# Patient Record
Sex: MALE | Race: BLACK OR AFRICAN AMERICAN | Employment: FULL TIME | URBAN - METROPOLITAN AREA
[De-identification: names, ages, dates, MRNs, and addresses within clinical notes are randomized per-mention and may not be internally consistent; named-entity substitution may affect disease eponyms.]

---

## 2021-01-25 ENCOUNTER — HOSPITAL ENCOUNTER (EMERGENCY)
Age: 56
Discharge: HOME | End: 2021-01-26
Attending: EMERGENCY MEDICINE
Payer: COMMERCIAL

## 2021-01-25 ENCOUNTER — APPOINTMENT (OUTPATIENT)
Dept: GENERAL RADIOLOGY | Age: 56
End: 2021-01-25
Attending: EMERGENCY MEDICINE
Payer: COMMERCIAL

## 2021-01-25 DIAGNOSIS — F41.1 ANXIETY IN ACUTE STRESS REACTION: ICD-10-CM

## 2021-01-25 DIAGNOSIS — R07.89 MUSCULOSKELETAL CHEST PAIN: ICD-10-CM

## 2021-01-25 DIAGNOSIS — F43.0 ANXIETY IN ACUTE STRESS REACTION: ICD-10-CM

## 2021-01-25 DIAGNOSIS — R07.9 ACUTE CHEST PAIN: Primary | ICD-10-CM

## 2021-01-25 LAB
BASOPHILS # BLD: 0 K/UL (ref 0–0.2)
BASOPHILS NFR BLD: 1 % (ref 0–2)
DIFFERENTIAL METHOD BLD: ABNORMAL
EOSINOPHIL # BLD: 0.1 K/UL (ref 0–0.8)
EOSINOPHIL NFR BLD: 2 % (ref 0.5–7.8)
ERYTHROCYTE [DISTWIDTH] IN BLOOD BY AUTOMATED COUNT: 13 % (ref 11.9–14.6)
HCT VFR BLD AUTO: 42.1 % (ref 41.1–50.3)
HGB BLD-MCNC: 13.7 G/DL (ref 13.6–17.2)
IMM GRANULOCYTES # BLD AUTO: 0 K/UL (ref 0–0.5)
IMM GRANULOCYTES NFR BLD AUTO: 0 % (ref 0–5)
LYMPHOCYTES # BLD: 2.5 K/UL (ref 0.5–4.6)
LYMPHOCYTES NFR BLD: 51 % (ref 13–44)
MCH RBC QN AUTO: 26.8 PG (ref 26.1–32.9)
MCHC RBC AUTO-ENTMCNC: 32.5 G/DL (ref 31.4–35)
MCV RBC AUTO: 82.2 FL (ref 79.6–97.8)
MONOCYTES # BLD: 0.6 K/UL (ref 0.1–1.3)
MONOCYTES NFR BLD: 12 % (ref 4–12)
NEUTS SEG # BLD: 1.7 K/UL (ref 1.7–8.2)
NEUTS SEG NFR BLD: 34 % (ref 43–78)
NRBC # BLD: 0 K/UL (ref 0–0.2)
PLATELET # BLD AUTO: 232 K/UL (ref 150–450)
PMV BLD AUTO: 8.4 FL (ref 9.4–12.3)
RBC # BLD AUTO: 5.12 M/UL (ref 4.23–5.6)
WBC # BLD AUTO: 4.9 K/UL (ref 4.3–11.1)

## 2021-01-25 PROCEDURE — 93005 ELECTROCARDIOGRAM TRACING: CPT | Performed by: EMERGENCY MEDICINE

## 2021-01-25 PROCEDURE — 85025 COMPLETE CBC W/AUTO DIFF WBC: CPT

## 2021-01-25 PROCEDURE — 99283 EMERGENCY DEPT VISIT LOW MDM: CPT

## 2021-01-25 PROCEDURE — 80053 COMPREHEN METABOLIC PANEL: CPT

## 2021-01-25 PROCEDURE — 84484 ASSAY OF TROPONIN QUANT: CPT

## 2021-01-25 PROCEDURE — 71046 X-RAY EXAM CHEST 2 VIEWS: CPT

## 2021-01-26 VITALS
TEMPERATURE: 97.7 F | SYSTOLIC BLOOD PRESSURE: 142 MMHG | RESPIRATION RATE: 16 BRPM | WEIGHT: 212 LBS | DIASTOLIC BLOOD PRESSURE: 80 MMHG | BODY MASS INDEX: 28.1 KG/M2 | HEART RATE: 62 BPM | OXYGEN SATURATION: 99 % | HEIGHT: 73 IN

## 2021-01-26 LAB
ALBUMIN SERPL-MCNC: 4 G/DL (ref 3.5–5)
ALBUMIN/GLOB SERPL: 1.1 {RATIO} (ref 1.2–3.5)
ALP SERPL-CCNC: 152 U/L (ref 50–136)
ALT SERPL-CCNC: 23 U/L (ref 12–65)
ANION GAP SERPL CALC-SCNC: 4 MMOL/L (ref 7–16)
AST SERPL-CCNC: 31 U/L (ref 15–37)
ATRIAL RATE: 63 BPM
BILIRUB SERPL-MCNC: 0.3 MG/DL (ref 0.2–1.1)
BUN SERPL-MCNC: 18 MG/DL (ref 6–23)
CALCIUM SERPL-MCNC: 8.9 MG/DL (ref 8.3–10.4)
CALCULATED P AXIS, ECG09: 65 DEGREES
CALCULATED R AXIS, ECG10: 65 DEGREES
CALCULATED T AXIS, ECG11: 65 DEGREES
CHLORIDE SERPL-SCNC: 101 MMOL/L (ref 98–107)
CO2 SERPL-SCNC: 32 MMOL/L (ref 21–32)
CREAT SERPL-MCNC: 1.12 MG/DL (ref 0.8–1.5)
DIAGNOSIS, 93000: NORMAL
GLOBULIN SER CALC-MCNC: 3.7 G/DL (ref 2.3–3.5)
GLUCOSE SERPL-MCNC: 89 MG/DL (ref 65–100)
P-R INTERVAL, ECG05: 190 MS
POTASSIUM SERPL-SCNC: 4.5 MMOL/L (ref 3.5–5.1)
PROT SERPL-MCNC: 7.7 G/DL (ref 6.3–8.2)
Q-T INTERVAL, ECG07: 378 MS
QRS DURATION, ECG06: 84 MS
QTC CALCULATION (BEZET), ECG08: 386 MS
SODIUM SERPL-SCNC: 137 MMOL/L (ref 136–145)
TROPONIN-HIGH SENSITIVITY: 5.4 PG/ML (ref 0–14)
VENTRICULAR RATE, ECG03: 63 BPM

## 2021-01-26 RX ORDER — HYDROXYZINE PAMOATE 25 MG/1
25 CAPSULE ORAL
Qty: 30 CAP | Refills: 0 | Status: SHIPPED | OUTPATIENT
Start: 2021-01-26 | End: 2021-02-09

## 2021-01-26 NOTE — ED TRIAGE NOTES
EKG performed prior to triage. Pt complains of intermittent left side chest pain X1 month. Pt reports he has been seen for this and had a stress test and was told that everything is fine.  Pt masked on arrival.

## 2021-01-26 NOTE — ED NOTES
I have reviewed discharge instructions with the patient. The patient verbalized understanding. Patient left ED via Discharge Method: ambulatory to Home with self    Opportunity for questions and clarification provided. Patient given 1 scripts. To continue your aftercare when you leave the hospital, you may receive an automated call from our care team to check in on how you are doing. This is a free service and part of our promise to provide the best care and service to meet your aftercare needs.  If you have questions, or wish to unsubscribe from this service please call [phone]. Thank you for Choosing our [facility].

## 2021-01-26 NOTE — ED PROVIDER NOTES
Per nurse's notes: \"EKG performed prior to triage. Pt complains of intermittent left side chest pain X1 month. Pt reports he has been seen for this and had a stress test and was told that everything is fine. Pt masked on arrival. \"    Patient tells me the symptoms have been going on for over 6 months. He was placed on Zoloft a couple of months ago for the same, it made him feel too weird and he stopped it after 5 days. The history is provided by the patient. Chest Pain (Angina)   This is a recurrent problem. Episode onset: About 6 months ago. The problem has been gradually worsening. Duration of episode(s) is 6 months. The problem occurs daily. The pain is associated with normal activity and stress. The pain is present in the lateral region and left side. The patient is experiencing no pain. The quality of the pain is described as sharp and stabbing. The pain does not radiate. The symptoms are aggravated by stress. Associated symptoms include numbness (Intermittent in both hands and sometimes from his left neck down to his left hand. ). Pertinent negatives include no abdominal pain, no back pain, no claudication, no cough, no diaphoresis, no dizziness, no exertional chest pressure, no fever, no headaches, no hemoptysis, no irregular heartbeat, no leg pain, no lower extremity edema, no malaise/fatigue, no nausea, no near-syncope, no orthopnea, no palpitations, no PND, no shortness of breath, no sputum production, no vomiting and no weakness. He has tried rest for the symptoms. The treatment provided no relief. Risk factors include male gender, dyslipidemia and family history. His past medical history does not include aneurysm, cancer, DM, DVT, HTN, PE or CHF. Procedural history includes exercise treadmill test (Patient states he had one about 4 months ago which was read as normal.).        Past Medical History:   Diagnosis Date    Dyslipidemia     Elevated LDL cholesterol level     FH: CAD (coronary artery disease)        History reviewed. No pertinent surgical history. History reviewed. No pertinent family history. Social History     Socioeconomic History    Marital status:      Spouse name: Not on file    Number of children: Not on file    Years of education: Not on file    Highest education level: Not on file   Occupational History    Not on file   Social Needs    Financial resource strain: Not on file    Food insecurity     Worry: Not on file     Inability: Not on file    Transportation needs     Medical: Not on file     Non-medical: Not on file   Tobacco Use    Smoking status: Never Smoker    Smokeless tobacco: Never Used   Substance and Sexual Activity    Alcohol use: Not on file    Drug use: Not on file    Sexual activity: Not on file   Lifestyle    Physical activity     Days per week: Not on file     Minutes per session: Not on file    Stress: Not on file   Relationships    Social connections     Talks on phone: Not on file     Gets together: Not on file     Attends Buddhist service: Not on file     Active member of club or organization: Not on file     Attends meetings of clubs or organizations: Not on file     Relationship status: Not on file    Intimate partner violence     Fear of current or ex partner: Not on file     Emotionally abused: Not on file     Physically abused: Not on file     Forced sexual activity: Not on file   Other Topics Concern    Not on file   Social History Narrative    Not on file         ALLERGIES: Patient has no known allergies. Review of Systems   Constitutional: Negative for diaphoresis, fever and malaise/fatigue. Respiratory: Negative for cough, hemoptysis, sputum production and shortness of breath. Cardiovascular: Positive for chest pain. Negative for palpitations, orthopnea, claudication, PND and near-syncope. Gastrointestinal: Negative for abdominal pain, constipation, diarrhea, nausea and vomiting.    Musculoskeletal: Negative for back pain. Neurological: Positive for numbness (Intermittent in both hands and sometimes from his left neck down to his left hand. ). Negative for dizziness, weakness and headaches. All other systems reviewed and are negative. Vitals:    01/25/21 2322 01/25/21 2339   BP: (!) 146/84    Pulse: 62    Resp: 17    Temp: 97.7 °F (36.5 °C)    SpO2: 99% 99%   Weight: 96.2 kg (212 lb)    Height: 6' 1\" (1.854 m)             Physical Exam  Vitals signs and nursing note reviewed. Constitutional:       General: He is not in acute distress. Appearance: He is well-developed. HENT:      Head: Normocephalic and atraumatic. Right Ear: External ear normal.      Left Ear: External ear normal.   Eyes:      Extraocular Movements: Extraocular movements intact. Conjunctiva/sclera: Conjunctivae normal.      Pupils: Pupils are equal, round, and reactive to light. Neck:      Musculoskeletal: Normal range of motion and neck supple. Cardiovascular:      Rate and Rhythm: Normal rate and regular rhythm. Heart sounds: Normal heart sounds. No murmur. Pulmonary:      Effort: Pulmonary effort is normal.      Breath sounds: Normal breath sounds. No wheezing, rhonchi or rales. Chest:      Chest wall: Tenderness present. Abdominal:      General: Bowel sounds are normal.      Palpations: Abdomen is soft. Tenderness: There is no abdominal tenderness. There is no right CVA tenderness or left CVA tenderness. Musculoskeletal: Normal range of motion. Skin:     General: Skin is warm and dry. Capillary Refill: Capillary refill takes less than 2 seconds. Neurological:      Mental Status: He is alert and oriented to person, place, and time. Psychiatric:         Mood and Affect: Affect normal. Mood is anxious.          Speech: Speech normal.         Cognition and Memory: Cognition and memory normal.          MDM  Number of Diagnoses or Management Options  Acute chest pain: new and requires workup  Anxiety in acute stress reaction: new and requires workup  Musculoskeletal chest pain: new and requires workup     Amount and/or Complexity of Data Reviewed  Clinical lab tests: ordered and reviewed  Tests in the radiology section of CPT®: ordered and reviewed  Tests in the medicine section of CPT®: ordered and reviewed (EKG interpretation dated 12 January 2021 at 2316. EKG reveals normal sinus rhythm at a rate of 63 with no acute ST or T wave changes, no evidence of ischemia or ectopy. Normal CA and QTc intervals. Essentially normal EKG)  Review and summarize past medical records: yes    Risk of Complications, Morbidity, and/or Mortality  Presenting problems: moderate  Diagnostic procedures: moderate  Management options: moderate    Patient Progress  Patient progress: stable         Procedures    The patient was observed in the ED. On review of the patient's prescription monitoring report, the patient has been on Klonopin and Valium for the same. I suspect his chest discomfort is more musculoskeletal and anxiety related. Patient be discharged home on Vistaril instructed follow-up with his family doctor in the next 2 days. Results Reviewed:      Recent Results (from the past 24 hour(s))   CBC WITH AUTOMATED DIFF    Collection Time: 01/25/21 11:34 PM   Result Value Ref Range    WBC 4.9 4.3 - 11.1 K/uL    RBC 5.12 4.23 - 5.6 M/uL    HGB 13.7 13.6 - 17.2 g/dL    HCT 42.1 41.1 - 50.3 %    MCV 82.2 79.6 - 97.8 FL    MCH 26.8 26.1 - 32.9 PG    MCHC 32.5 31.4 - 35.0 g/dL    RDW 13.0 11.9 - 14.6 %    PLATELET 966 493 - 662 K/uL    MPV 8.4 (L) 9.4 - 12.3 FL    ABSOLUTE NRBC 0.00 0.0 - 0.2 K/uL    DF AUTOMATED      NEUTROPHILS 34 (L) 43 - 78 %    LYMPHOCYTES 51 (H) 13 - 44 %    MONOCYTES 12 4.0 - 12.0 %    EOSINOPHILS 2 0.5 - 7.8 %    BASOPHILS 1 0.0 - 2.0 %    IMMATURE GRANULOCYTES 0 0.0 - 5.0 %    ABS. NEUTROPHILS 1.7 1.7 - 8.2 K/UL    ABS. LYMPHOCYTES 2.5 0.5 - 4.6 K/UL    ABS. MONOCYTES 0.6 0.1 - 1.3 K/UL    ABS. EOSINOPHILS 0.1 0.0 - 0.8 K/UL    ABS. BASOPHILS 0.0 0.0 - 0.2 K/UL    ABS. IMM. GRANS. 0.0 0.0 - 0.5 K/UL   METABOLIC PANEL, COMPREHENSIVE    Collection Time: 01/25/21 11:34 PM   Result Value Ref Range    Sodium 137 136 - 145 mmol/L    Potassium 4.5 3.5 - 5.1 mmol/L    Chloride 101 98 - 107 mmol/L    CO2 32 21 - 32 mmol/L    Anion gap 4 (L) 7 - 16 mmol/L    Glucose 89 65 - 100 mg/dL    BUN 18 6 - 23 MG/DL    Creatinine 1.12 0.8 - 1.5 MG/DL    GFR est AA >60 >60 ml/min/1.73m2    GFR est non-AA >60 >60 ml/min/1.73m2    Calcium 8.9 8.3 - 10.4 MG/DL    Bilirubin, total 0.3 0.2 - 1.1 MG/DL    ALT (SGPT) 23 12 - 65 U/L    AST (SGOT) 31 15 - 37 U/L    Alk. phosphatase 152 (H) 50 - 136 U/L    Protein, total 7.7 6.3 - 8.2 g/dL    Albumin 4.0 3.5 - 5.0 g/dL    Globulin 3.7 (H) 2.3 - 3.5 g/dL    A-G Ratio 1.1 (L) 1.2 - 3.5     TROPONIN-HIGH SENSITIVITY    Collection Time: 01/25/21 11:34 PM   Result Value Ref Range    Troponin-High Sensitivity 5.4 0 - 14 pg/mL     XR CHEST PA LAT   Final Result      Clear lungs. I discussed the results of all labs, procedures, radiographs, and treatments with the patient and available family. Treatment plan is agreed upon and the patient is ready for discharge. All voiced understanding of the discharge plan and medication instructions or changes as appropriate. Questions about treatment in the ED were answered. All were encouraged to return should symptoms worsen or new problems develop.

## 2021-07-02 ENCOUNTER — HOSPITAL ENCOUNTER (OUTPATIENT)
Dept: LAB | Age: 56
Discharge: HOME | End: 2021-07-02

## 2021-07-02 PROCEDURE — 88312 SPECIAL STAINS GROUP 1: CPT

## 2021-07-02 PROCEDURE — 88305 TISSUE EXAM BY PATHOLOGIST: CPT
